# Patient Record
Sex: FEMALE | Race: WHITE | NOT HISPANIC OR LATINO | URBAN - METROPOLITAN AREA
[De-identification: names, ages, dates, MRNs, and addresses within clinical notes are randomized per-mention and may not be internally consistent; named-entity substitution may affect disease eponyms.]

---

## 2022-01-26 ENCOUNTER — OFFICE (OUTPATIENT)
Dept: URBAN - METROPOLITAN AREA CLINIC 79 | Facility: CLINIC | Age: 62
End: 2022-01-26

## 2022-01-26 VITALS
SYSTOLIC BLOOD PRESSURE: 145 MMHG | WEIGHT: 293 LBS | HEIGHT: 67 IN | HEART RATE: 77 BPM | DIASTOLIC BLOOD PRESSURE: 84 MMHG | TEMPERATURE: 97.1 F

## 2022-01-26 DIAGNOSIS — E61.1 IRON DEFICIENCY: ICD-10-CM

## 2022-01-26 PROCEDURE — 99203 OFFICE O/P NEW LOW 30 MIN: CPT

## 2022-01-26 PROCEDURE — 00031: CPT | Performed by: INTERNAL MEDICINE

## 2022-01-26 NOTE — SERVICEHPINOTES
MARK WONG   is a   61   year old    female who is being seen in consultation at the request of   DEVANG LARSON   for anemia. Patient states Dr. Larson is her endocrinologist and he asked her to see GI as her most recent labs showed low iron and that this could be due to "bleeding". She denies hx of iron deficiency anemia and states she is unsure why iron was checked. She is asymptomatic. Notes her bowel habits vary, she has multiple BMs in one day then skips 1-2 days, stools are type 4 and brown in color. No straining. No visible blood. No upper GI sx.br She denies abdominal pain, diarrhea, fevers, chills, night sweats, unintentional weight loss.
br 
--Last colonoscopy 2/2016: mild diverticulosis and internal hemorrhoids. no polyps, R/c 7-10 yrs.
br
br Labs 1/17/22: Hgb 12.1, MCV 80.5, MCH 24.5, MCHC 30.5. Fe 33, Fe Sat 7, Ferritin 5, B12 205.br

## 2022-01-30 LAB
CELIAC DISEASE COMPREHENSIVE PANEL: IMMUNOGLOBULIN A: 537 MG/DL — HIGH (ref 70–320)
CELIAC DISEASE COMPREHENSIVE PANEL: INTERPRETATION: (no result)
CELIAC DISEASE COMPREHENSIVE PANEL: TISSUE TRANSGLUTAMINASE AB, IGA: <1 U/ML

## 2022-02-01 ENCOUNTER — PREPPED CHART (OUTPATIENT)
Dept: URBAN - METROPOLITAN AREA CLINIC 80 | Facility: CLINIC | Age: 62
End: 2022-02-01

## 2022-02-01 PROBLEM — E11.3513 PROLIFERATIVE DIABETIC RETINOPATHY: Status: STABILIZING | Noted: 2022-02-01 | Resolved: 2022-02-01

## 2022-02-01 PROBLEM — E11.3513 PROLIFERATIVE DIABETIC RETINOPATHY: Status: STABILIZING | Noted: 2022-02-01

## 2022-02-01 PROBLEM — E11.3513 DIABETES, TYPE II WITH OCULAR COMPLICATIONS: Noted: 2022-02-01

## 2022-02-01 PROBLEM — E11.3513 PROLIFERATIVE DIABETIC RETINOPATHY: Noted: 2022-02-01

## 2022-02-01 PROBLEM — E11.3513 DIABETIC MACULAR EDEMA: Noted: 2022-02-01

## 2022-02-01 PROBLEM — Z96.1 PSEUDOPHAKIA: Noted: 2022-02-01

## 2022-02-01 PROBLEM — Z96.1 PSEUDOPHAKIA: Status: WELL-CONTROLLED | Noted: 2022-02-01 | Resolved: 2022-02-01

## 2022-02-01 PROBLEM — E11.3513 DIABETES, TYPE II WITH OCULAR COMPLICATIONS: Status: WELL-CONTROLLED | Noted: 2022-02-01 | Resolved: 2022-02-01

## 2022-02-01 PROBLEM — E11.3513 DIABETIC MACULAR EDEMA: Status: RESOLVED | Noted: 2022-02-01 | Resolved: 2022-02-01

## 2022-02-01 PROBLEM — E11.3513 DIABETIC MACULAR EDEMA: Status: WELL-CONTROLLED | Noted: 2022-02-01

## 2022-02-01 PROBLEM — Q14.1 CHRPE: Status: STABILIZING | Noted: 2022-02-01

## 2022-02-01 PROBLEM — Q14.1 CHRPE: Noted: 2022-02-01

## 2022-02-01 PROBLEM — Q14.1 CHRPE: Status: STABILIZING | Noted: 2022-02-01 | Resolved: 2022-02-01

## 2022-03-17 ENCOUNTER — ON CAMPUS - OUTPATIENT (OUTPATIENT)
Dept: URBAN - METROPOLITAN AREA HOSPITAL 65 | Facility: HOSPITAL | Age: 62
End: 2022-03-17

## 2022-03-17 DIAGNOSIS — D50.9 IRON DEFICIENCY ANEMIA, UNSPECIFIED: ICD-10-CM

## 2022-03-17 PROCEDURE — 45378 DIAGNOSTIC COLONOSCOPY: CPT | Performed by: INTERNAL MEDICINE

## 2022-04-14 ASSESSMENT — VISUAL ACUITY
OS_CC: 20/20
OD_CC: 20/20

## 2022-04-14 ASSESSMENT — TONOMETRY
OS_IOP_MMHG: 14
OD_IOP_MMHG: 14

## 2022-04-19 ENCOUNTER — ESTABLISHED COMPREHENSIVE EXAM (OUTPATIENT)
Dept: URBAN - METROPOLITAN AREA CLINIC 80 | Facility: CLINIC | Age: 62
End: 2022-04-19

## 2022-04-19 DIAGNOSIS — Z96.1: ICD-10-CM

## 2022-04-19 DIAGNOSIS — Q14.1: ICD-10-CM

## 2022-04-19 DIAGNOSIS — E11.3513: ICD-10-CM

## 2022-04-19 PROCEDURE — 92014 COMPRE OPH EXAM EST PT 1/>: CPT

## 2022-04-19 PROCEDURE — 92202 OPSCPY EXTND ON/MAC DRAW: CPT

## 2022-04-19 PROCEDURE — 92134 CPTRZ OPH DX IMG PST SGM RTA: CPT

## 2022-04-19 ASSESSMENT — TONOMETRY
OS_IOP_MMHG: 15
OD_IOP_MMHG: 14

## 2022-04-19 ASSESSMENT — VISUAL ACUITY
OS_CC: 20/20-1
OD_CC: 20/20

## 2022-06-09 ENCOUNTER — OFFICE (OUTPATIENT)
Dept: URBAN - METROPOLITAN AREA CLINIC 34 | Facility: CLINIC | Age: 62
End: 2022-06-09
Payer: COMMERCIAL

## 2022-06-09 VITALS
WEIGHT: 293 LBS | DIASTOLIC BLOOD PRESSURE: 91 MMHG | TEMPERATURE: 97.7 F | HEIGHT: 67 IN | SYSTOLIC BLOOD PRESSURE: 128 MMHG | HEART RATE: 81 BPM

## 2022-06-09 DIAGNOSIS — E61.1 IRON DEFICIENCY: ICD-10-CM

## 2022-06-09 DIAGNOSIS — E53.8 DEFICIENCY OF OTHER SPECIFIED B GROUP VITAMINS: ICD-10-CM

## 2022-06-09 PROCEDURE — 99214 OFFICE O/P EST MOD 30 MIN: CPT

## 2022-06-09 NOTE — SERVICEHPINOTES
61 year old female, hx DM x22yrs, HTN, presents for follow up.
br
Initial eval 1/26/22 for anemia. Patient states Dr. Larson is her endocrinologist and he asked her to see GI as her most recent labs showed low iron and that this could be due to "bleeding". She denies hx of iron deficiency anemia and states she is unsure why iron was checked. She is asymptomatic. Notes her bowel habits vary, she has multiple BMs in one day then skips 1-2 days, stools are type 4 and brown in color. No straining. No visible blood. No upper GI sx.brShe denies abdominal pain, diarrhea, fevers, chills, night sweats, unintentional weight loss.brLabs 1/17/22: Hgb 12.1, MCV 80.5, MCH 24.5, MCHC 30.5. Fe 33, Fe Sat 7, Ferritin 5, B12 205.
br
br 
--1/26/22 Celiac neg.
br --3/17/22 Colonoscopy: small internal hemorrhoids, diverticulosis of ascending and sigmoid colon. R/c 10 yrs
br --4/20/2022 PCP labs: A1C 6.7, LFTs normal, Hgb 11.8, MCV 81, MCH 25.4, MCHC 31.5. Fe 25, Fe Sat 6, Ferritin 11, B12 792 (on iron and b12 supplements).br

br
Today patient reports feeling well. She was advised to return if ongoing iron deficiency for EGD. 
br Denies heartburn, acid regurgitation, nausea, vomiting, bloating, burping, epigastric pain. 
br She does not take antacids. On synjardy.br visited="true"

## 2022-06-16 ENCOUNTER — ON CAMPUS - OUTPATIENT (OUTPATIENT)
Dept: URBAN - METROPOLITAN AREA HOSPITAL 65 | Facility: HOSPITAL | Age: 62
End: 2022-06-16
Payer: COMMERCIAL

## 2022-06-16 DIAGNOSIS — D50.9 IRON DEFICIENCY ANEMIA, UNSPECIFIED: ICD-10-CM

## 2022-06-16 DIAGNOSIS — K29.60 OTHER GASTRITIS WITHOUT BLEEDING: ICD-10-CM

## 2022-06-16 PROCEDURE — 43239 EGD BIOPSY SINGLE/MULTIPLE: CPT | Performed by: INTERNAL MEDICINE

## 2022-07-28 ENCOUNTER — OFFICE (OUTPATIENT)
Dept: URBAN - METROPOLITAN AREA CLINIC 79 | Facility: CLINIC | Age: 62
End: 2022-07-28
Payer: COMMERCIAL

## 2022-07-28 VITALS
WEIGHT: 293 LBS | HEART RATE: 80 BPM | HEIGHT: 67 IN | SYSTOLIC BLOOD PRESSURE: 131 MMHG | TEMPERATURE: 97.7 F | DIASTOLIC BLOOD PRESSURE: 81 MMHG

## 2022-07-28 DIAGNOSIS — E61.1 IRON DEFICIENCY: ICD-10-CM

## 2022-07-28 PROCEDURE — 99214 OFFICE O/P EST MOD 30 MIN: CPT

## 2022-07-28 NOTE — SERVICEHPINOTES
62 year old female, hx DM x22yrs, HTN, presents for follow up.marisa cunningham Initial eval 1/26/22 for anemia. Patient states Dr. Larson is her endocrinologist and he asked her to see GI as her most recent labs showed low iron and that this could be due to "bleeding". She denies hx of iron deficiency anemia and states she is unsure why iron was checked. She is asymptomatic. Notes her bowel habits vary, she has multiple BMs in one day then skips 1-2 days, stools are type 4 and brown in color. No straining. No visible blood. No upper GI sx.brShe denies abdominal pain, diarrhea, fevers, chills, night sweats, unintentional weight loss.
br
br Follow up 6/9/22. Reports feeling well. She was advised to return if ongoing iron deficiency for EGD. Denies heartburn, acid regurgitation, nausea, vomiting, bloating, burping, epigastric pain. She does not take antacids. On Synjardy.
br
brPatient without complaints today. She received results of EGD bx noting gastritis but is asymptomatic, no GI discomfort at all.br  
br ------------------------------br--1/17/22 PCP labs: Hgb 12.1, MCV 80.5, MCH 24.5, MCHC 30.5. Fe 33, Fe Sat 7, Ferritin 5, B12 205.br--1/26/22 Celiac neg.br--3/17/22 Colonoscopy: small internal hemorrhoids, diverticulosis of ascending and sigmoid colon. R/c 10 yrsbr--4/20/22 PCP labs: Hgb 11.8, MCV 81, MCH 25.4, MCHC 31.5. Fe 25, Fe Sat 6, Ferritin 11, B12 792 (on iron and b12 supplements). A1C 6.7, LFTs normal.br 
--6/16/22 EGD: esophagus normal. moderate erythematous, friable and granular mucosa in body of stomach and antrum (Bx mild to moderate chronic gastritis, neg h. pylori). normal duodenum (Bx neg).
br --7/7/22 PCP labs: Hgb 13.8, MCV 85.8, MCH 26.8, MCHC 31.2. Fe 77, Fe Sat 19, Ferritin 13.br

## 2022-09-01 ENCOUNTER — FOLLOW UP (OUTPATIENT)
Dept: URBAN - METROPOLITAN AREA CLINIC 80 | Facility: CLINIC | Age: 62
End: 2022-09-01

## 2022-09-01 DIAGNOSIS — Z96.1: ICD-10-CM

## 2022-09-01 DIAGNOSIS — E11.3513: ICD-10-CM

## 2022-09-01 DIAGNOSIS — Q14.1: ICD-10-CM

## 2022-09-01 PROCEDURE — 92134 CPTRZ OPH DX IMG PST SGM RTA: CPT

## 2022-09-01 PROCEDURE — 92014 COMPRE OPH EXAM EST PT 1/>: CPT

## 2022-09-01 PROCEDURE — 92202 OPSCPY EXTND ON/MAC DRAW: CPT

## 2022-09-01 ASSESSMENT — VISUAL ACUITY
OS_CC: 20/20
OD_CC: 20/20-1

## 2022-09-01 ASSESSMENT — TONOMETRY
OD_IOP_MMHG: 13
OS_IOP_MMHG: 15

## 2022-10-07 LAB
CBC (INCLUDES DIFF/PLT): ABSOLUTE BAND NEUTROPHILS: NORMAL CELLS/UL
CBC (INCLUDES DIFF/PLT): ABSOLUTE BASOPHILS: 68 CELLS/UL (ref 0–200)
CBC (INCLUDES DIFF/PLT): ABSOLUTE BLASTS: NORMAL CELLS/UL
CBC (INCLUDES DIFF/PLT): ABSOLUTE EOSINOPHILS: 184 CELLS/UL (ref 15–500)
CBC (INCLUDES DIFF/PLT): ABSOLUTE LYMPHOCYTES: 2584 CELLS/UL (ref 850–3900)
CBC (INCLUDES DIFF/PLT): ABSOLUTE METAMYELOCYTES: NORMAL CELLS/UL
CBC (INCLUDES DIFF/PLT): ABSOLUTE MONOCYTES: 558 CELLS/UL (ref 200–950)
CBC (INCLUDES DIFF/PLT): ABSOLUTE MYELOCYTES: NORMAL CELLS/UL
CBC (INCLUDES DIFF/PLT): ABSOLUTE NEUTROPHILS: 3407 CELLS/UL (ref 1500–7800)
CBC (INCLUDES DIFF/PLT): ABSOLUTE NUCLEATED RBC: NORMAL CELLS/UL
CBC (INCLUDES DIFF/PLT): ABSOLUTE PROMYELOCYTES: NORMAL CELLS/UL
CBC (INCLUDES DIFF/PLT): BAND NEUTROPHILS: NORMAL %
CBC (INCLUDES DIFF/PLT): BASOPHILS: 1 %
CBC (INCLUDES DIFF/PLT): BLASTS: NORMAL %
CBC (INCLUDES DIFF/PLT): COMMENT(S): NORMAL
CBC (INCLUDES DIFF/PLT): EOSINOPHILS: 2.7 %
CBC (INCLUDES DIFF/PLT): HEMATOCRIT: 44 % (ref 35–45)
CBC (INCLUDES DIFF/PLT): HEMOGLOBIN: 14.5 G/DL (ref 11.7–15.5)
CBC (INCLUDES DIFF/PLT): LYMPHOCYTES: 38 %
CBC (INCLUDES DIFF/PLT): MCH: 29.2 PG (ref 27–33)
CBC (INCLUDES DIFF/PLT): MCHC: 33 G/DL (ref 32–36)
CBC (INCLUDES DIFF/PLT): MCV: 88.5 FL (ref 80–100)
CBC (INCLUDES DIFF/PLT): METAMYELOCYTES: NORMAL %
CBC (INCLUDES DIFF/PLT): MONOCYTES: 8.2 %
CBC (INCLUDES DIFF/PLT): MPV: 12.3 FL (ref 7.5–12.5)
CBC (INCLUDES DIFF/PLT): MYELOCYTES: NORMAL %
CBC (INCLUDES DIFF/PLT): NEUTROPHILS: 50.1 %
CBC (INCLUDES DIFF/PLT): NUCLEATED RBC: NORMAL /100 WBC
CBC (INCLUDES DIFF/PLT): PLATELET COUNT: 216 THOUSAND/UL (ref 140–400)
CBC (INCLUDES DIFF/PLT): PROMYELOCYTES: NORMAL %
CBC (INCLUDES DIFF/PLT): RDW: 13.5 % (ref 11–15)
CBC (INCLUDES DIFF/PLT): REACTIVE LYMPHOCYTES: NORMAL %
CBC (INCLUDES DIFF/PLT): RED BLOOD CELL COUNT: 4.97 MILLION/UL (ref 3.8–5.1)
CBC (INCLUDES DIFF/PLT): WHITE BLOOD CELL COUNT: 6.8 THOUSAND/UL (ref 3.8–10.8)
FERRITIN: 14 NG/ML — LOW (ref 16–288)
IRON AND TOTAL IRON BINDING CAPACITY: % SATURATION: 20 % (CALC) (ref 16–45)
IRON AND TOTAL IRON BINDING CAPACITY: IRON BINDING CAPACITY: 392 MCG/DL (CALC) (ref 250–450)
IRON AND TOTAL IRON BINDING CAPACITY: IRON, TOTAL: 77 MCG/DL (ref 45–160)

## 2023-02-06 ENCOUNTER — OFFICE (OUTPATIENT)
Dept: URBAN - METROPOLITAN AREA CLINIC 34 | Facility: CLINIC | Age: 63
End: 2023-02-06

## 2023-02-06 DIAGNOSIS — E61.1 IRON DEFICIENCY: ICD-10-CM

## 2023-02-06 DIAGNOSIS — D50.9 IRON DEFICIENCY ANEMIA, UNSPECIFIED: ICD-10-CM

## 2023-02-06 PROCEDURE — 91110 GI TRC IMG INTRAL ESOPH-ILE: CPT | Performed by: INTERNAL MEDICINE

## 2023-04-18 ENCOUNTER — OFFICE (OUTPATIENT)
Dept: URBAN - METROPOLITAN AREA CLINIC 79 | Facility: CLINIC | Age: 63
End: 2023-04-18
Payer: COMMERCIAL

## 2023-04-18 VITALS
HEART RATE: 75 BPM | SYSTOLIC BLOOD PRESSURE: 147 MMHG | TEMPERATURE: 98.2 F | WEIGHT: 293 LBS | DIASTOLIC BLOOD PRESSURE: 85 MMHG | HEIGHT: 67 IN

## 2023-04-18 DIAGNOSIS — K29.70 GASTRITIS, UNSPECIFIED, WITHOUT BLEEDING: ICD-10-CM

## 2023-04-18 DIAGNOSIS — E61.1 IRON DEFICIENCY: ICD-10-CM

## 2023-04-18 PROCEDURE — 99214 OFFICE O/P EST MOD 30 MIN: CPT

## 2023-04-18 RX ORDER — PANTOPRAZOLE 40 MG/1
TABLET, DELAYED RELEASE ORAL
Qty: 60 | Refills: 0 | Status: ACTIVE

## 2023-04-18 NOTE — SERVICEHPINOTES
62 year old female, hx DM x22yrs, HTN, following up for LIBRADO.br
--1/26/22 Celiac neg.
br --3/17/22 Colonoscopy: small internal hemorrhoids, diverticulosis of ascending and sigmoid colon. R/c 10 yrsbr
--6/16/22 EGD: esophagus normal. moderate erythematous, friable and granular mucosa in body of stomach and antrum (Bx mild to moderate chronic gastritis, neg h. pylori). normal duodenum (Bx neg).
br 
--2/6/23 Capsule: active gastritis, o/w normal. Rec: treat gastritis, replete iron prn.br
------------------------------------------------------------------------1/26/22 Initial eval for anemia. Patient states Dr. Larson is her endocrinologist and he asked her to see GI as her most recent labs showed low iron and that this could be due to "bleeding". She denies hx of iron deficiency anemia and states she is unsure why iron was checked. She is asymptomatic. Notes her bowel habits vary, she has multiple BMs in one day then skips 1-2 days, stools are type 4 and brown in color. No straining. No visible blood. No upper GI sx.brShe denies abdominal pain, diarrhea, fevers, chills, night sweats, unintentional weight loss.6/9/22 Follow up. Reports feeling well. She was advised to return if ongoing iron deficiency for EGD. Denies heartburn, acid regurgitation, nausea, vomiting, bloating, burping, epigastric pain. She does not take antacids. On Synjardy.7/28/22 Follow up: Patient without complaints today. She received results of EGD bx noting gastritis but is asymptomatic, no GI discomfort at all.
br
br 4/18/23 Follow up: Patient is here to discuss capsule results and recommendations. She notes her PCP reviewed the report last month and started her on pantoprazole qd (3/28). She remains asymptomatic. Last labs are from Dec Hgb 14.9, MCV 88.2. Ferritin 11 rest of iron studies wnl. At that point she had been off iron supplement x3mo. No complaints.br